# Patient Record
Sex: MALE | Race: BLACK OR AFRICAN AMERICAN | NOT HISPANIC OR LATINO
[De-identification: names, ages, dates, MRNs, and addresses within clinical notes are randomized per-mention and may not be internally consistent; named-entity substitution may affect disease eponyms.]

---

## 2018-04-19 ENCOUNTER — APPOINTMENT (OUTPATIENT)
Dept: CARDIOLOGY | Facility: CLINIC | Age: 43
End: 2018-04-19
Payer: COMMERCIAL

## 2018-04-19 ENCOUNTER — RECORD ABSTRACTING (OUTPATIENT)
Age: 43
End: 2018-04-19

## 2018-04-19 VITALS
RESPIRATION RATE: 16 BRPM | SYSTOLIC BLOOD PRESSURE: 120 MMHG | DIASTOLIC BLOOD PRESSURE: 90 MMHG | BODY MASS INDEX: 25.48 KG/M2 | WEIGHT: 178 LBS | HEART RATE: 69 BPM | HEIGHT: 70 IN

## 2018-04-19 VITALS — DIASTOLIC BLOOD PRESSURE: 86 MMHG | SYSTOLIC BLOOD PRESSURE: 116 MMHG

## 2018-04-19 DIAGNOSIS — I45.4 NONSPECIFIC INTRAVENTRICULAR BLOCK: ICD-10-CM

## 2018-04-19 DIAGNOSIS — Z78.9 OTHER SPECIFIED HEALTH STATUS: ICD-10-CM

## 2018-04-19 PROCEDURE — 93000 ELECTROCARDIOGRAM COMPLETE: CPT

## 2018-04-19 PROCEDURE — 99214 OFFICE O/P EST MOD 30 MIN: CPT

## 2018-06-18 ENCOUNTER — APPOINTMENT (OUTPATIENT)
Dept: CARDIOLOGY | Facility: CLINIC | Age: 43
End: 2018-06-18
Payer: COMMERCIAL

## 2018-06-18 PROCEDURE — 93306 TTE W/DOPPLER COMPLETE: CPT

## 2018-07-30 ENCOUNTER — APPOINTMENT (OUTPATIENT)
Dept: CARDIOLOGY | Facility: CLINIC | Age: 43
End: 2018-07-30

## 2019-01-24 ENCOUNTER — APPOINTMENT (OUTPATIENT)
Dept: CARDIOLOGY | Facility: CLINIC | Age: 44
End: 2019-01-24
Payer: COMMERCIAL

## 2019-01-24 ENCOUNTER — NON-APPOINTMENT (OUTPATIENT)
Age: 44
End: 2019-01-24

## 2019-01-24 VITALS
WEIGHT: 170 LBS | RESPIRATION RATE: 16 BRPM | SYSTOLIC BLOOD PRESSURE: 118 MMHG | BODY MASS INDEX: 24.34 KG/M2 | DIASTOLIC BLOOD PRESSURE: 67 MMHG | HEIGHT: 70 IN

## 2019-01-24 DIAGNOSIS — Z86.79 PERSONAL HISTORY OF OTHER DISEASES OF THE CIRCULATORY SYSTEM: ICD-10-CM

## 2019-01-24 DIAGNOSIS — Z00.00 ENCOUNTER FOR GENERAL ADULT MEDICAL EXAMINATION W/OUT ABNORMAL FINDINGS: ICD-10-CM

## 2019-01-24 PROCEDURE — 99214 OFFICE O/P EST MOD 30 MIN: CPT

## 2019-01-24 PROCEDURE — 93000 ELECTROCARDIOGRAM COMPLETE: CPT

## 2019-01-24 NOTE — ASSESSMENT
[FreeTextEntry1] : EKG demonstrated a normal sinus rhythm with borderline first-degree AV block and otherwise no acute changes;\par \par In summary the patient is a 43-year-old gentleman with a history of ascending aortic aneurysm and otherwise stable cardiac pattern.;\par \par He believes that he had another CT and she'll of the chest sometime this past fall but we have not been able to locate where and when he had a study;\par \par Plan:\par \par Patient recommended to continue moderate physical exercise with certain restrictions discussed;\par \par Recommend repeat transthoracic echo by next visit within 3-4 months;\par \par Patient to report any untoward chest symptoms or discomfort between now and then;

## 2019-01-24 NOTE — HISTORY OF PRESENT ILLNESS
[FreeTextEntry1] : He states he's been physically active exercising regularly and has been eating as a "vegen", he states he goes on a very S. fasting and liquid fasting diets as well along with his wife;\par \par Planning a vacation trip to the [facility] [place] next month;\par ;

## 2019-01-24 NOTE — REASON FOR VISIT
[Follow-Up - Clinic] : a clinic follow-up of [FreeTextEntry1] : The patient is a 43-year-old  gentleman with known history of moderately enlarged ascending aorta and aortic root which is been followed over the past several years in this office.;\par \par He has been largely asymptomatic from a cardiac standpoint without chest pain, shortness of breath, palpitations, dizziness or syncope.\par \par His last transthoracic echocardiogram in June of 2017 showed an ascending aorta in the range of 4.5 cm;\par Prior to that in March of 2017 ACT scan of the chest also confirmed a ascending aortic enlargement of 3.4 cm which was deemed to be a "stable";

## 2019-01-24 NOTE — PHYSICAL EXAM
[General Appearance - Well Developed] : well developed [Normal Appearance] : normal appearance [Well Groomed] : well groomed [General Appearance - Well Nourished] : well nourished [No Deformities] : no deformities [General Appearance - In No Acute Distress] : no acute distress [Normal Conjunctiva] : the conjunctiva exhibited no abnormalities [Eyelids - No Xanthelasma] : the eyelids demonstrated no xanthelasmas [Normal Oral Mucosa] : normal oral mucosa [No Oral Pallor] : no oral pallor [No Oral Cyanosis] : no oral cyanosis [Normal Jugular Venous A Waves Present] : normal jugular venous A waves present [Normal Jugular Venous V Waves Present] : normal jugular venous V waves present [No Jugular Venous Santiago A Waves] : no jugular venous santiago A waves [Respiration, Rhythm And Depth] : normal respiratory rhythm and effort [Exaggerated Use Of Accessory Muscles For Inspiration] : no accessory muscle use [Auscultation Breath Sounds / Voice Sounds] : lungs were clear to auscultation bilaterally [Heart Rate And Rhythm] : heart rate and rhythm were normal [Heart Sounds] : normal S1 and S2 [Murmurs] : no murmurs present [Abdomen Soft] : soft [Abdomen Tenderness] : non-tender [Abdomen Mass (___ Cm)] : no abdominal mass palpated [Abnormal Walk] : normal gait [Gait - Sufficient For Exercise Testing] : the gait was sufficient for exercise testing [Nail Clubbing] : no clubbing of the fingernails [Cyanosis, Localized] : no localized cyanosis [Petechial Hemorrhages (___cm)] : no petechial hemorrhages [Skin Color & Pigmentation] : normal skin color and pigmentation [] : no rash [No Venous Stasis] : no venous stasis [Skin Lesions] : no skin lesions [No Skin Ulcers] : no skin ulcer [No Xanthoma] : no  xanthoma was observed [Oriented To Time, Place, And Person] : oriented to person, place, and time [Affect] : the affect was normal [Mood] : the mood was normal [No Anxiety] : not feeling anxious

## 2019-04-30 ENCOUNTER — APPOINTMENT (OUTPATIENT)
Dept: CARDIOLOGY | Facility: CLINIC | Age: 44
End: 2019-04-30
Payer: COMMERCIAL

## 2019-04-30 PROCEDURE — 93306 TTE W/DOPPLER COMPLETE: CPT

## 2019-05-08 ENCOUNTER — NON-APPOINTMENT (OUTPATIENT)
Age: 44
End: 2019-05-08

## 2019-05-08 ENCOUNTER — APPOINTMENT (OUTPATIENT)
Dept: CARDIOLOGY | Facility: CLINIC | Age: 44
End: 2019-05-08
Payer: COMMERCIAL

## 2019-05-08 VITALS
HEART RATE: 66 BPM | DIASTOLIC BLOOD PRESSURE: 72 MMHG | SYSTOLIC BLOOD PRESSURE: 118 MMHG | BODY MASS INDEX: 26.34 KG/M2 | RESPIRATION RATE: 16 BRPM | HEIGHT: 70 IN | WEIGHT: 184 LBS

## 2019-05-08 PROCEDURE — 99214 OFFICE O/P EST MOD 30 MIN: CPT

## 2019-05-08 PROCEDURE — 93000 ELECTROCARDIOGRAM COMPLETE: CPT

## 2019-05-08 NOTE — ASSESSMENT
[FreeTextEntry1] : EKG shows normal sinus rhythm with borderline first-degree AV block and no acute changes;\par \par In summary the patient is a 43-year-old gentleman who had a transthoracic echocardiogram recently and demonstrated preserved cardiac chamber size with normal systolic function of the left ventricle and normal ejection fraction greater than 60%\par There was moderate dilatation of the ascending aorta and aortic root in the range of 4.50 cm(essentially unchanged from previous);\par \par In summary the patient is a 43-year-old gentleman with known history for dilated moderate ascending aortic aneurysm and aortic root for which he has remained asymptomatic\par \par Plan:\par \par No additional cardiac workup indicated at this time\par \par Followup to this office within 6 months or p.r.n.\par \par We'll continue to monitor the patient clinically and with serial echoes in the future;;;;\par \par ;;

## 2019-05-08 NOTE — HISTORY OF PRESENT ILLNESS
[FreeTextEntry1] : He does a moderate  amount of physical exercise and activity without difficulty;;(now doing pull- up exercises)

## 2019-05-08 NOTE — PHYSICAL EXAM
[General Appearance - Well Developed] : well developed [Normal Appearance] : normal appearance [Well Groomed] : well groomed [General Appearance - Well Nourished] : well nourished [No Deformities] : no deformities [General Appearance - In No Acute Distress] : no acute distress [Eyelids - No Xanthelasma] : the eyelids demonstrated no xanthelasmas [Normal Conjunctiva] : the conjunctiva exhibited no abnormalities [Normal Oral Mucosa] : normal oral mucosa [No Oral Pallor] : no oral pallor [No Oral Cyanosis] : no oral cyanosis [Normal Jugular Venous V Waves Present] : normal jugular venous V waves present [Normal Jugular Venous A Waves Present] : normal jugular venous A waves present [No Jugular Venous Santiago A Waves] : no jugular venous santiago A waves [Exaggerated Use Of Accessory Muscles For Inspiration] : no accessory muscle use [Auscultation Breath Sounds / Voice Sounds] : lungs were clear to auscultation bilaterally [Heart Rate And Rhythm] : heart rate and rhythm were normal [Respiration, Rhythm And Depth] : normal respiratory rhythm and effort [Murmurs] : no murmurs present [Heart Sounds] : normal S1 and S2 [Abdomen Mass (___ Cm)] : no abdominal mass palpated [Abdomen Tenderness] : non-tender [Abdomen Soft] : soft [Gait - Sufficient For Exercise Testing] : the gait was sufficient for exercise testing [Nail Clubbing] : no clubbing of the fingernails [Abnormal Walk] : normal gait [Petechial Hemorrhages (___cm)] : no petechial hemorrhages [Cyanosis, Localized] : no localized cyanosis [] : no rash [Skin Color & Pigmentation] : normal skin color and pigmentation [No Venous Stasis] : no venous stasis [Skin Lesions] : no skin lesions [No Skin Ulcers] : no skin ulcer [No Xanthoma] : no  xanthoma was observed [Oriented To Time, Place, And Person] : oriented to person, place, and time [Affect] : the affect was normal [Mood] : the mood was normal [No Anxiety] : not feeling anxious

## 2019-05-08 NOTE — REASON FOR VISIT
[Follow-Up - Clinic] : a clinic follow-up of [FreeTextEntry1] : The patient is a 43-year-old  gentleman with a known history for moderate enlarged ascending aorta and aortic root which is being followed by serial echocardiograms over the past several years.\par \par Patient reports he is returned to reevaluate and discuss results of most recent echo\par \par He remains asymptomatic for chest pain, shortness of breath, palpitations, dizziness or syncope;;;

## 2019-10-03 ENCOUNTER — APPOINTMENT (OUTPATIENT)
Dept: CARDIOLOGY | Facility: CLINIC | Age: 44
End: 2019-10-03

## 2019-10-17 ENCOUNTER — NON-APPOINTMENT (OUTPATIENT)
Age: 44
End: 2019-10-17

## 2019-10-17 ENCOUNTER — APPOINTMENT (OUTPATIENT)
Dept: CARDIOLOGY | Facility: CLINIC | Age: 44
End: 2019-10-17
Payer: COMMERCIAL

## 2019-10-17 VITALS
BODY MASS INDEX: 25.77 KG/M2 | SYSTOLIC BLOOD PRESSURE: 133 MMHG | HEIGHT: 70 IN | WEIGHT: 180 LBS | HEART RATE: 63 BPM | DIASTOLIC BLOOD PRESSURE: 82 MMHG | RESPIRATION RATE: 16 BRPM

## 2019-10-17 PROCEDURE — 99212 OFFICE O/P EST SF 10 MIN: CPT

## 2019-10-17 PROCEDURE — 93000 ELECTROCARDIOGRAM COMPLETE: CPT

## 2019-10-17 NOTE — ASSESSMENT
[FreeTextEntry1] : EKG 10/17/2019:  The EKG illustrates sinus rhythm, rate of 63 bpm, 1st degree AV block pattern, no significant ST-T wave changes.  Essentially unchanged.\par \par In summary the patient is a 43-year-old gentleman who had a transthoracic echocardiogram recently (April 2019) and demonstrated preserved cardiac chamber size with normal systolic function of the left ventricle and normal ejection fraction greater than 60%\par There was moderate dilatation of the ascending aorta and aortic root in the range of 4.50 cm(essentially unchanged from previous);

## 2019-10-17 NOTE — DISCUSSION/SUMMARY
[FreeTextEntry1] : 1).  Continue with diet and lifestyle modification including low fat and low carbohydrate diet.  Continue with implementing aerobic exercise regimen few days per week. \par \par 2).  Encouraged patient to establish and follow up with PCP regarding routine checkups and blood work, have copy faxed to our office. \par \par 3).  No additional cardiac testing indicated at this time. Will discuss repeating serial echoes at next office visit.\par \par 4).  Follow up with our office in 6 months or PRN.

## 2019-10-17 NOTE — REASON FOR VISIT
[FreeTextEntry1] : The patient is a 43-year-old  gentleman with a known history for moderate enlarged ascending aorta and aortic root which is being followed by serial echocardiograms over the past several years.  Mr. Steven reports feeling overall well and without cardiac complaints.  He denies CP, SOB, HILL, PND, orthopnea, palpitations, presyncope, syncope, edema.

## 2019-10-17 NOTE — PHYSICAL EXAM
[General Appearance - Well Developed] : well developed [Normal Appearance] : normal appearance [General Appearance - Well Nourished] : well nourished [General Appearance - In No Acute Distress] : no acute distress [Normal Conjunctiva] : the conjunctiva exhibited no abnormalities [Normal Oropharynx] : normal oropharynx [Normal Oral Mucosa] : normal oral mucosa [Normal Jugular Venous V Waves Present] : normal jugular venous V waves present [Normal Jugular Venous A Waves Present] : normal jugular venous A waves present [No Jugular Venous Santiago A Waves] : no jugular venous santiago A waves [] : no respiratory distress [Respiration, Rhythm And Depth] : normal respiratory rhythm and effort [Auscultation Breath Sounds / Voice Sounds] : lungs were clear to auscultation bilaterally [Heart Sounds] : normal S1 and S2 [Heart Rate And Rhythm] : heart rate and rhythm were normal [Murmurs] : no murmurs present [Edema] : no peripheral edema present [Bowel Sounds] : normal bowel sounds [Nail Clubbing] : no clubbing of the fingernails [Abnormal Walk] : normal gait [Cyanosis, Localized] : no localized cyanosis [Skin Color & Pigmentation] : normal skin color and pigmentation [Impaired Insight] : insight and judgment were intact [Oriented To Time, Place, And Person] : oriented to person, place, and time [Skin Turgor] : normal skin turgor [Affect] : the affect was normal

## 2019-10-17 NOTE — HISTORY OF PRESENT ILLNESS
[FreeTextEntry1] : He continues to do a moderate amount of physical exercise without difficulty; pull-up exercise, free weights and [employer] classes.\par \par He reports eating a well balance diet low in carbohydrates and fats.

## 2020-07-23 ENCOUNTER — APPOINTMENT (OUTPATIENT)
Dept: CARDIOLOGY | Facility: CLINIC | Age: 45
End: 2020-07-23
Payer: COMMERCIAL

## 2020-07-23 ENCOUNTER — NON-APPOINTMENT (OUTPATIENT)
Age: 45
End: 2020-07-23

## 2020-07-23 VITALS
DIASTOLIC BLOOD PRESSURE: 80 MMHG | BODY MASS INDEX: 25.48 KG/M2 | WEIGHT: 178 LBS | HEART RATE: 77 BPM | SYSTOLIC BLOOD PRESSURE: 119 MMHG | HEIGHT: 70 IN | RESPIRATION RATE: 16 BRPM | TEMPERATURE: 97.8 F

## 2020-07-23 PROCEDURE — 99214 OFFICE O/P EST MOD 30 MIN: CPT

## 2020-07-23 PROCEDURE — 93000 ELECTROCARDIOGRAM COMPLETE: CPT

## 2020-07-23 NOTE — REASON FOR VISIT
[Follow-Up - Clinic] : a clinic follow-up of [FreeTextEntry1] : The patient is a 44-year-old  gentleman with known history for enlarged ascending aorta and aortic root who is been monitored in our office over the past many years with periodic echo and CT scan of the chest;\par \par Fortunately, he has been largely asymptomatic for chest pain, shortness of breath, palpitations or dizziness;\par \par Patient has not required any medication for his blood pressure which is usually been normal\par \par He reports he's been somewhat less physically active but now working almost full-time again on his job following safety guidelines during the corona virus pandemic;;

## 2020-07-23 NOTE — PHYSICAL EXAM
[General Appearance - Well Developed] : well developed [Normal Appearance] : normal appearance [Well Groomed] : well groomed [General Appearance - Well Nourished] : well nourished [No Deformities] : no deformities [Normal Conjunctiva] : the conjunctiva exhibited no abnormalities [Eyelids - No Xanthelasma] : the eyelids demonstrated no xanthelasmas [General Appearance - In No Acute Distress] : no acute distress [No Oral Pallor] : no oral pallor [No Oral Cyanosis] : no oral cyanosis [Normal Oral Mucosa] : normal oral mucosa [Normal Jugular Venous A Waves Present] : normal jugular venous A waves present [No Jugular Venous Santiago A Waves] : no jugular venous santiago A waves [Normal Jugular Venous V Waves Present] : normal jugular venous V waves present [Exaggerated Use Of Accessory Muscles For Inspiration] : no accessory muscle use [Respiration, Rhythm And Depth] : normal respiratory rhythm and effort [Auscultation Breath Sounds / Voice Sounds] : lungs were clear to auscultation bilaterally [Heart Sounds] : normal S1 and S2 [Heart Rate And Rhythm] : heart rate and rhythm were normal [Murmurs] : no murmurs present [Abdomen Soft] : soft [Abdomen Mass (___ Cm)] : no abdominal mass palpated [Abdomen Tenderness] : non-tender [Abnormal Walk] : normal gait [Gait - Sufficient For Exercise Testing] : the gait was sufficient for exercise testing [Nail Clubbing] : no clubbing of the fingernails [Petechial Hemorrhages (___cm)] : no petechial hemorrhages [Cyanosis, Localized] : no localized cyanosis [Skin Color & Pigmentation] : normal skin color and pigmentation [No Venous Stasis] : no venous stasis [] : no rash [Skin Lesions] : no skin lesions [No Xanthoma] : no  xanthoma was observed [No Skin Ulcers] : no skin ulcer [Oriented To Time, Place, And Person] : oriented to person, place, and time [Affect] : the affect was normal [Mood] : the mood was normal [No Anxiety] : not feeling anxious

## 2020-07-23 NOTE — ASSESSMENT
[FreeTextEntry1] : EKG shows normal sinus rhythm at a rate of 77 essentially within normal limits\par \par Summary this 44-year-old black male has a history for moderate enlarged ascending aorta which has been previously stable in size over the past few years\par \par Plan:\par \par Patient recommended to schedule later this summer CT angiogram of chest to evaluate ascending aortic dimensions;\par \par Followup in this office within 5-6 months or p.r.n.;\par \par Patient to report any significant chest pain between now and next visit\par ;\par Patient to report any untoward chest symptoms;;

## 2020-07-23 NOTE — HISTORY OF PRESENT ILLNESS
[FreeTextEntry1] : Patient states that some of his trips abroad were canceled and vacations canceled;\par \par Last transthoracic echocardiogram was April 2019 and demonstrated good cardiac function with either at enlarged ascending aorta in the range of 4.5 cm-essentially unchanged from previous;

## 2020-10-20 ENCOUNTER — NON-APPOINTMENT (OUTPATIENT)
Age: 45
End: 2020-10-20

## 2020-12-15 ENCOUNTER — NON-APPOINTMENT (OUTPATIENT)
Age: 45
End: 2020-12-15

## 2020-12-15 ENCOUNTER — APPOINTMENT (OUTPATIENT)
Dept: CARDIOLOGY | Facility: CLINIC | Age: 45
End: 2020-12-15
Payer: COMMERCIAL

## 2020-12-15 VITALS
RESPIRATION RATE: 16 BRPM | HEART RATE: 78 BPM | BODY MASS INDEX: 25.34 KG/M2 | HEIGHT: 70 IN | SYSTOLIC BLOOD PRESSURE: 118 MMHG | DIASTOLIC BLOOD PRESSURE: 82 MMHG | WEIGHT: 177 LBS | TEMPERATURE: 96.9 F

## 2020-12-15 DIAGNOSIS — I77.810 THORACIC AORTIC ECTASIA: ICD-10-CM

## 2020-12-15 PROCEDURE — 99072 ADDL SUPL MATRL&STAF TM PHE: CPT

## 2020-12-15 PROCEDURE — 93000 ELECTROCARDIOGRAM COMPLETE: CPT

## 2020-12-15 PROCEDURE — 99214 OFFICE O/P EST MOD 30 MIN: CPT

## 2020-12-15 NOTE — HISTORY OF PRESENT ILLNESS
[FreeTextEntry1] : CT scan from 10/15/20 demonstrated ascending thoracic aorta are at 4.6 cm with aortic root measuring 4.3 cm--which appears to be essentially unchanged from the echocardiogram from 2019;\par \par Patient reports that he and his wife they have sold their house and currently living in an apartment and [place],  to the [facility];\par \par

## 2020-12-15 NOTE — REASON FOR VISIT
[Follow-Up - Clinic] : a clinic follow-up of [FreeTextEntry1] : The patient is a 44-year-old black male with a known history for enlarged aortic root and ascending aorta followed over the past several years with periodic echocardiograms and CT scan of the chest;\par \par He returns to the office today for general cardiac checkup and to discuss results of last CT scan of the chest;\par \par Fortunately, he has been asymptomatic for chest pain, shortness of breath, palpitations or dizziness;\par \par

## 2020-12-15 NOTE — ASSESSMENT
[FreeTextEntry1] : EKG demonstrates normal sinus rhythm at a rate of 78. Borderline first degree AV block. No acute changes;\par \par In summary this 44-year-old gentleman has a history of an enlarged ascending aorta and aortic root for which he has been asymptomatic. He has had a stable cardiac pattern and recent CT scan of the chest showed stable aortic dimensions;\par \par Plan:\par \par Recommend transthoracic echocardiogram prior to next visit within for a five-month;\par \par If [place], offered to forward cardiac records when he gets established with a new physician;\par \par Patient to keep in touch with our office as well if need to adjust for future visits to this office  p.r.n.;\par \par

## 2021-04-12 ENCOUNTER — APPOINTMENT (OUTPATIENT)
Dept: CARDIOLOGY | Facility: CLINIC | Age: 46
End: 2021-04-12

## 2021-04-26 ENCOUNTER — APPOINTMENT (OUTPATIENT)
Dept: CARDIOLOGY | Facility: CLINIC | Age: 46
End: 2021-04-26
Payer: COMMERCIAL

## 2021-04-26 PROCEDURE — 93306 TTE W/DOPPLER COMPLETE: CPT

## 2021-04-26 PROCEDURE — 99072 ADDL SUPL MATRL&STAF TM PHE: CPT

## 2021-05-04 ENCOUNTER — NON-APPOINTMENT (OUTPATIENT)
Age: 46
End: 2021-05-04

## 2021-05-04 ENCOUNTER — APPOINTMENT (OUTPATIENT)
Dept: CARDIOLOGY | Facility: CLINIC | Age: 46
End: 2021-05-04
Payer: COMMERCIAL

## 2021-05-04 VITALS
DIASTOLIC BLOOD PRESSURE: 74 MMHG | SYSTOLIC BLOOD PRESSURE: 107 MMHG | BODY MASS INDEX: 25.34 KG/M2 | TEMPERATURE: 97.4 F | WEIGHT: 177 LBS | RESPIRATION RATE: 16 BRPM | HEART RATE: 88 BPM | HEIGHT: 70 IN

## 2021-05-04 DIAGNOSIS — I71.2 THORACIC AORTIC ANEURYSM, W/OUT RUPTURE: ICD-10-CM

## 2021-05-04 DIAGNOSIS — I38 ENDOCARDITIS, VALVE UNSPECIFIED: ICD-10-CM

## 2021-05-04 PROCEDURE — 99072 ADDL SUPL MATRL&STAF TM PHE: CPT

## 2021-05-04 PROCEDURE — 99213 OFFICE O/P EST LOW 20 MIN: CPT

## 2021-05-04 PROCEDURE — 93000 ELECTROCARDIOGRAM COMPLETE: CPT

## 2021-05-04 NOTE — PHYSICAL EXAM
[Well Developed] : well developed [Well Nourished] : well nourished [No Acute Distress] : no acute distress [Normal Conjunctiva] : normal conjunctiva [Normal Venous Pressure] : normal venous pressure [No Carotid Bruit] : no carotid bruit [Normal S1, S2] : normal S1, S2 [No Murmur] : no murmur [No Rub] : no rub [No Gallop] : no gallop [Clear Lung Fields] : clear lung fields [Good Air Entry] : good air entry [No Respiratory Distress] : no respiratory distress  [Soft] : abdomen soft [Non Tender] : non-tender [No Masses/organomegaly] : no masses/organomegaly [Normal Gait] : normal gait [No Edema] : no edema [No Cyanosis] : no cyanosis [No Clubbing] : no clubbing [No Varicosities] : no varicosities [No Rash] : no rash [No Skin Lesions] : no skin lesions [Moves all extremities] : moves all extremities [No Focal Deficits] : no focal deficits [Normal Speech] : normal speech [Alert and Oriented] : alert and oriented [Normal memory] : normal memory

## 2021-05-05 NOTE — ASSESSMENT
[FreeTextEntry1] : EKG 5/4/2021:  The EKG illustrates sinus rhythm, rate of 88 bpm, 1st degree AV block pattern, poor R wave progression V1 to V3.  Essentially unchanged.\par \par In summary this 45-year-old gentleman has a history of an enlarged ascending aorta and aortic root for which he has been asymptomatic. He has had a stable cardiac pattern and recent echocardiogram and CT scan of the chest showed stable aortic dimensions;

## 2021-05-05 NOTE — DISCUSSION/SUMMARY
[FreeTextEntry1] : 1).  Patient reassured the most recent echocardiogram demonstrated no significant signs of worsening ascending aorta dilation and stable valvulopathy with overall good cardiac function.\par \par 2).  Continue to eat a well balance diet and to participate in formal aerobic activity.  \par \par 3).  Follow up with PCP regarding routine checkups and blood work.  Forward all testing/lab work to our office. \par \par 4).  [place], he is to fax our office to have records sent to newly established cardiologist and any other phsyicians.\par \par 5).  Patient to keep in touch with our office as well if need to adjust for future visits to this office p.r.n.;

## 2021-05-05 NOTE — HISTORY OF PRESENT ILLNESS
[FreeTextEntry1] : Patient remains off any prescribed medications;\par \par He continues to eat a well balanced diet low in sodium, fats and carbohydrates.  He also continues to participate in formal aerobic activity on regular basis without complication;\par \par Transthoracic Echocardiogram (4/26/2021):  Patient's ascending aorta dilation remains stable with moderate dilation (aortic root at 4.6 cm and ascending aorta at 4.6 cm).  Normal global LV systolic function with an LVEF of 55 to 60%.  The left and right atria normal in size and structure.  Trace MR, mild TR, trace AR.  There is no evidence of pericardial effusion;\par \par CT scan from 10/15/20 demonstrated ascending thoracic aorta are at 4.6 cm with aortic root measuring 4.3 cm--which appears to be essentially unchanged from the echocardiogram from 2019;\par \par Patient reports that he and his wife they have sold their house and currently living in an apartment and will be moving permanently down south, to the [facility];

## 2021-05-05 NOTE — REASON FOR VISIT
[FreeTextEntry1] : CHRISTIANA GARCIA is being seen for a clinic follow-up of. \par \par The patient is a 45-year-old black male with a known history for enlarged aortic root and ascending aorta followed over the past several years with periodic echocardiograms and CT scan of the chest;\par \par He returns to the office today for general cardiac checkup and to discuss results of last echocardiogram;\par \par Fortunately, he has been asymptomatic for chest pain, shortness of breath, palpitations or dizziness;